# Patient Record
(demographics unavailable — no encounter records)

---

## 2025-05-20 NOTE — HISTORY OF PRESENT ILLNESS
[FreeTextEntry1] : 50 yo had vaginal spotting last year 05/2024 now had full flow period last yr did EMB was technically very difficult and very scant cells were obtained pt is morbidly obese

## 2025-05-20 NOTE — PLAN
[FreeTextEntry1] : post menopausal bleeding EMB technically not possible advised D&C hysteroscopy she is high risk for endometrial cancer at her BMI FSH TSH and CBC drawn pt will need medical clearance for this procedure mammo ref given

## 2025-05-20 NOTE — PHYSICAL EXAM
[MA] : MA [Labia Majora] : normal [Labia Minora] : normal [No Bleeding] : There was no active vaginal bleeding [Normal] : normal [Uterine Adnexae] : normal

## 2025-05-20 NOTE — PROCEDURE
[Abnormal Uterine Bleeding] : abnormal uterine bleeding [Transvaginal Ultrasound] : transvaginal ultrasound [FreeTextEntry3] : uterus small and anteverted,  the endometrium is somewhat heterogenous and measures 7.9 mm, there are lucent spaces in the endocervical canal c/w polyps  no FF in the pelvis ovaries not visualized